# Patient Record
Sex: MALE | URBAN - METROPOLITAN AREA
[De-identification: names, ages, dates, MRNs, and addresses within clinical notes are randomized per-mention and may not be internally consistent; named-entity substitution may affect disease eponyms.]

---

## 2024-04-03 ENCOUNTER — LAB (OUTPATIENT)
Dept: URBAN - METROPOLITAN AREA CLINIC 113 | Facility: CLINIC | Age: 69
End: 2024-04-03

## 2024-04-03 ENCOUNTER — OV CON (OUTPATIENT)
Dept: URBAN - METROPOLITAN AREA CLINIC 113 | Facility: CLINIC | Age: 69
End: 2024-04-03
Payer: MEDICARE

## 2024-04-03 VITALS
RESPIRATION RATE: 16 BRPM | DIASTOLIC BLOOD PRESSURE: 105 MMHG | SYSTOLIC BLOOD PRESSURE: 135 MMHG | TEMPERATURE: 97.3 F | WEIGHT: 144 LBS | HEIGHT: 67 IN | HEART RATE: 86 BPM | BODY MASS INDEX: 22.6 KG/M2

## 2024-04-03 DIAGNOSIS — K43.2 INCISIONAL HERNIA, WITHOUT OBSTRUCTION OR GANGRENE: ICD-10-CM

## 2024-04-03 DIAGNOSIS — Z86.010 HISTORY OF ADENOMATOUS POLYP OF COLON: ICD-10-CM

## 2024-04-03 PROBLEM — 429047008: Status: ACTIVE | Noted: 2024-04-03

## 2024-04-03 PROCEDURE — 99203 OFFICE O/P NEW LOW 30 MIN: CPT | Performed by: NURSE PRACTITIONER

## 2024-04-03 RX ORDER — ALBUTEROL SULFATE 90 UG/1
1 PUFF AS NEEDED AEROSOL, METERED RESPIRATORY (INHALATION)
Status: ACTIVE | COMMUNITY

## 2024-04-03 RX ORDER — MONTELUKAST SODIUM 10 MG/1
TAKE 1 TABLET DAILY TABLET, FILM COATED ORAL
Refills: 0 | Status: ON HOLD | COMMUNITY
Start: 2008-03-06

## 2024-04-03 RX ORDER — FEXOFENADINE HCL 180 MG/1
TAKE 1 TABLET DAILY TABLET ORAL
Refills: 0 | Status: ACTIVE | COMMUNITY
Start: 2008-03-06

## 2024-04-03 NOTE — PHYSICAL EXAM GASTROINTESTINAL
Abdominal incision in the mid abdomen, just above the umbilicus with large incisional hernia noted to be protruding. Hernia is soft, nontender, but not easily reducible. No incarceration or strangulation of this hernia.

## 2024-06-03 ENCOUNTER — TELEPHONE ENCOUNTER (OUTPATIENT)
Dept: URBAN - METROPOLITAN AREA CLINIC 113 | Facility: CLINIC | Age: 69
End: 2024-06-03

## 2024-06-04 ENCOUNTER — DASHBOARD ENCOUNTERS (OUTPATIENT)
Age: 69
End: 2024-06-04

## 2024-06-04 ENCOUNTER — OFFICE VISIT (OUTPATIENT)
Dept: URBAN - METROPOLITAN AREA CLINIC 113 | Facility: CLINIC | Age: 69
End: 2024-06-04
Payer: MEDICARE

## 2024-06-04 ENCOUNTER — LAB OUTSIDE AN ENCOUNTER (OUTPATIENT)
Dept: URBAN - METROPOLITAN AREA CLINIC 113 | Facility: CLINIC | Age: 69
End: 2024-06-04

## 2024-06-04 VITALS
SYSTOLIC BLOOD PRESSURE: 134 MMHG | HEIGHT: 67 IN | RESPIRATION RATE: 18 BRPM | WEIGHT: 142 LBS | TEMPERATURE: 97.3 F | BODY MASS INDEX: 22.29 KG/M2 | DIASTOLIC BLOOD PRESSURE: 97 MMHG | HEART RATE: 93 BPM

## 2024-06-04 DIAGNOSIS — K92.1 MELENA: ICD-10-CM

## 2024-06-04 PROCEDURE — 99214 OFFICE O/P EST MOD 30 MIN: CPT | Performed by: NURSE PRACTITIONER

## 2024-06-04 RX ORDER — FEXOFENADINE HCL 180 MG/1
TAKE 1 TABLET DAILY TABLET ORAL
Refills: 0 | Status: ACTIVE | COMMUNITY
Start: 2008-03-06

## 2024-06-04 RX ORDER — MONTELUKAST SODIUM 10 MG/1
TAKE 1 TABLET DAILY TABLET, FILM COATED ORAL
Refills: 0 | Status: ON HOLD | COMMUNITY
Start: 2008-03-06

## 2024-06-04 RX ORDER — PANTOPRAZOLE SODIUM 40 MG/1
1 TABLET TABLET, DELAYED RELEASE ORAL ONCE A DAY
Status: ACTIVE | COMMUNITY

## 2024-06-04 RX ORDER — PANTOPRAZOLE SODIUM 40 MG/1
1 TABLET TABLET, DELAYED RELEASE ORAL ONCE A DAY
Qty: 90 TABLET | Refills: 2 | OUTPATIENT
Start: 2024-06-04

## 2024-06-04 RX ORDER — ALBUTEROL SULFATE 90 UG/1
1 PUFF AS NEEDED AEROSOL, METERED RESPIRATORY (INHALATION)
Status: ACTIVE | COMMUNITY

## 2024-06-04 NOTE — HPI-TODAY'S VISIT:
69 yo male with a history of adenomatous colon polyps, presenting for follow up.  He was seen in the office in April 2024 to discuss a colonoscopy in the setting COPD and an incisional hernia. A colonoscopy was deferred until hernia was addressed; a CTa/p with contrast was ordered to better define the hernia. It does not appear the CT was ever completed.  He was hospitalized briefly at [facility] 6/3/24 for subjective melena with hemoglobin noted to be 14.1, MCV 89.4, Plt 253. CTa/p with contrast revealed no acute abnormalities in the abdomen/pelvis. Fecal occult blood test was positive.   There is no trouble with swallowing. He denies any indigestion. There is some upper abdominal soreness. There is some more discomfort on an empty stomach. His appetite is decreased. He denies any nausea or vomiting. His weight is down one or two pounds per his report, attributed to decreased oral intake. He has bowel movements most every day. Stools are dark in color. There is sometime dark red blood per rectum.

## 2024-06-06 ENCOUNTER — CLAIMS CREATED FROM THE CLAIM WINDOW (OUTPATIENT)
Dept: URBAN - METROPOLITAN AREA SURGERY CENTER 25 | Facility: SURGERY CENTER | Age: 69
End: 2024-06-06
Payer: MEDICARE

## 2024-06-06 ENCOUNTER — CLAIMS CREATED FROM THE CLAIM WINDOW (OUTPATIENT)
Dept: URBAN - METROPOLITAN AREA CLINIC 4 | Facility: CLINIC | Age: 69
End: 2024-06-06
Payer: MEDICARE

## 2024-06-06 DIAGNOSIS — K31.7 POLYP OF STOMACH AND DUODENUM: ICD-10-CM

## 2024-06-06 DIAGNOSIS — K92.1 MELENA: ICD-10-CM

## 2024-06-06 DIAGNOSIS — K31.7 BENIGN GASTRIC POLYP: ICD-10-CM

## 2024-06-06 DIAGNOSIS — K29.70 GASTRITIS, UNSPECIFIED, WITHOUT BLEEDING: ICD-10-CM

## 2024-06-06 DIAGNOSIS — K29.70 CHRONIC ACITVE GASTRITIS (H.PYLORI NEGATIVE): ICD-10-CM

## 2024-06-06 DIAGNOSIS — K92.1 ACUTE MELENA: ICD-10-CM

## 2024-06-06 DIAGNOSIS — K31.89 OTHER DISEASES OF STOMACH AND DUODENUM: ICD-10-CM

## 2024-06-06 PROCEDURE — 43251 EGD REMOVE LESION SNARE: CPT | Performed by: CLINIC/CENTER

## 2024-06-06 PROCEDURE — 43239 EGD BIOPSY SINGLE/MULTIPLE: CPT | Performed by: INTERNAL MEDICINE

## 2024-06-06 PROCEDURE — 88342 IMHCHEM/IMCYTCHM 1ST ANTB: CPT | Performed by: PATHOLOGY

## 2024-06-06 PROCEDURE — G8907 PT DOC NO EVENTS ON DISCHARG: HCPCS | Performed by: CLINIC/CENTER

## 2024-06-06 PROCEDURE — 43251 EGD REMOVE LESION SNARE: CPT | Performed by: INTERNAL MEDICINE

## 2024-06-06 PROCEDURE — 43239 EGD BIOPSY SINGLE/MULTIPLE: CPT | Performed by: CLINIC/CENTER

## 2024-06-06 PROCEDURE — 00731 ANES UPR GI NDSC PX NOS: CPT | Performed by: ANESTHESIOLOGIST ASSISTANT

## 2024-06-06 PROCEDURE — 88305 TISSUE EXAM BY PATHOLOGIST: CPT | Performed by: PATHOLOGY

## 2024-06-06 PROCEDURE — 00731 ANES UPR GI NDSC PX NOS: CPT | Performed by: ANESTHESIOLOGY

## 2024-06-06 RX ORDER — FEXOFENADINE HCL 180 MG/1
TAKE 1 TABLET DAILY TABLET ORAL
Refills: 0 | Status: ACTIVE | COMMUNITY
Start: 2008-03-06

## 2024-06-06 RX ORDER — ALBUTEROL SULFATE 90 UG/1
1 PUFF AS NEEDED AEROSOL, METERED RESPIRATORY (INHALATION)
Status: ACTIVE | COMMUNITY

## 2024-06-06 RX ORDER — PANTOPRAZOLE SODIUM 40 MG/1
1 TABLET TABLET, DELAYED RELEASE ORAL ONCE A DAY
Qty: 90 TABLET | Refills: 2 | Status: ACTIVE | COMMUNITY
Start: 2024-06-04

## 2024-06-06 RX ORDER — MONTELUKAST SODIUM 10 MG/1
TAKE 1 TABLET DAILY TABLET, FILM COATED ORAL
Refills: 0 | Status: ON HOLD | COMMUNITY
Start: 2008-03-06

## 2024-06-06 RX ORDER — PANTOPRAZOLE SODIUM 40 MG/1
1 TABLET TABLET, DELAYED RELEASE ORAL ONCE A DAY
Status: ACTIVE | COMMUNITY

## 2024-06-13 ENCOUNTER — LAB OUTSIDE AN ENCOUNTER (OUTPATIENT)
Dept: URBAN - METROPOLITAN AREA CLINIC 113 | Facility: CLINIC | Age: 69
End: 2024-06-13

## 2024-06-13 ENCOUNTER — TELEPHONE ENCOUNTER (OUTPATIENT)
Dept: URBAN - METROPOLITAN AREA CLINIC 113 | Facility: CLINIC | Age: 69
End: 2024-06-13

## 2024-06-13 PROBLEM — 449341000124102: Status: ACTIVE | Noted: 2024-06-13

## 2024-06-13 PROBLEM — 59614000: Status: ACTIVE | Noted: 2024-06-13

## 2024-06-13 RX ORDER — POLYETHYLENE GLYCOL 3350, SODIUM CHLORIDE, SODIUM BICARBONATE, POTASSIUM CHLORIDE 420; 11.2; 5.72; 1.48 G/4L; G/4L; G/4L; G/4L
AS DIRECTED POWDER, FOR SOLUTION ORAL ONCE
Qty: 420 G | Refills: 0 | OUTPATIENT
Start: 2024-06-13 | End: 2024-06-14

## 2024-07-11 ENCOUNTER — OFFICE VISIT (OUTPATIENT)
Dept: URBAN - METROPOLITAN AREA CLINIC 113 | Facility: CLINIC | Age: 69
End: 2024-07-11
Payer: MEDICARE

## 2024-07-11 VITALS
TEMPERATURE: 97.3 F | BODY MASS INDEX: 22.26 KG/M2 | DIASTOLIC BLOOD PRESSURE: 95 MMHG | RESPIRATION RATE: 20 BRPM | WEIGHT: 141.8 LBS | HEIGHT: 67 IN | SYSTOLIC BLOOD PRESSURE: 133 MMHG | HEART RATE: 97 BPM

## 2024-07-11 DIAGNOSIS — R19.5 HEME POSITIVE STOOL: ICD-10-CM

## 2024-07-11 DIAGNOSIS — K92.1 MELENA: ICD-10-CM

## 2024-07-11 DIAGNOSIS — Z86.010 HISTORY OF ADENOMATOUS POLYP OF COLON: ICD-10-CM

## 2024-07-11 PROCEDURE — 99214 OFFICE O/P EST MOD 30 MIN: CPT | Performed by: NURSE PRACTITIONER

## 2024-07-11 RX ORDER — PANTOPRAZOLE SODIUM 40 MG/1
1 TABLET TABLET, DELAYED RELEASE ORAL ONCE A DAY
OUTPATIENT
Start: 2024-06-04

## 2024-07-11 RX ORDER — PANTOPRAZOLE SODIUM 40 MG/1
1 TABLET TABLET, DELAYED RELEASE ORAL ONCE A DAY
Qty: 90 TABLET | Refills: 2 | Status: ACTIVE | COMMUNITY
Start: 2024-06-04

## 2024-07-11 RX ORDER — FEXOFENADINE HCL 180 MG/1
TAKE 1 TABLET DAILY TABLET ORAL
Refills: 0 | Status: ACTIVE | COMMUNITY
Start: 2008-03-06

## 2024-07-11 RX ORDER — MONTELUKAST SODIUM 10 MG/1
TAKE 1 TABLET DAILY TABLET, FILM COATED ORAL
Refills: 0 | Status: ON HOLD | COMMUNITY
Start: 2008-03-06

## 2024-07-11 RX ORDER — ALBUTEROL SULFATE 90 UG/1
1 PUFF AS NEEDED AEROSOL, METERED RESPIRATORY (INHALATION)
Status: ACTIVE | COMMUNITY

## 2024-07-11 NOTE — HPI-TODAY'S VISIT:
70 yo male with a history of adenomatous colon polyps, presenting for follow up after an EGD performed for melena.  He was started on pantoprazole and planned for EGD.  EGD 6/6/24: Normal mucosa in the entire esophagus, regular Z-line 40 cmg from the incisors, erythematous mucosa in the gastric body and antrum s/p biopsies. Resection of two gastric  hyperplastic polyps. Nodular mucosa in the entire examined. Stomach biopsies revealed focal intestinal metaplasia; a repeat EGD is recommended in 6 to 12 months.  Following the EGD, he was recommended a colonoscopy. This has not been scheduled.  There is no trouble with swallowing or heartburn. He is taking pantoprazole 40 mg daily. No abdomina pain, nausea or vomiting. He does admit some increased gas. There is no melena or red blood per rectum.

## 2024-08-13 PROBLEM — 84568007: Status: ACTIVE | Noted: 2024-08-13

## 2024-08-13 PROBLEM — 78809005: Status: ACTIVE | Noted: 2024-08-13

## 2024-08-13 PROBLEM — 72519002: Status: ACTIVE | Noted: 2024-08-13

## 2024-08-14 ENCOUNTER — OFFICE VISIT (OUTPATIENT)
Dept: URBAN - METROPOLITAN AREA CLINIC 113 | Facility: CLINIC | Age: 69
End: 2024-08-14
Payer: MEDICARE

## 2024-08-14 VITALS
HEIGHT: 67 IN | RESPIRATION RATE: 18 BRPM | DIASTOLIC BLOOD PRESSURE: 74 MMHG | HEART RATE: 90 BPM | WEIGHT: 139.6 LBS | TEMPERATURE: 97.3 F | SYSTOLIC BLOOD PRESSURE: 113 MMHG | BODY MASS INDEX: 21.91 KG/M2

## 2024-08-14 DIAGNOSIS — K31.A0 GASTRIC INTESTINAL METAPLASIA: ICD-10-CM

## 2024-08-14 DIAGNOSIS — K29.40 ATROPHIC GASTRITIS WITHOUT HEMORRHAGE: ICD-10-CM

## 2024-08-14 DIAGNOSIS — K31.7 BENIGN GASTRIC POLYP: ICD-10-CM

## 2024-08-14 DIAGNOSIS — K92.1 MELENA: ICD-10-CM

## 2024-08-14 DIAGNOSIS — R19.5 HEME POSITIVE STOOL: ICD-10-CM

## 2024-08-14 DIAGNOSIS — Z86.010 HISTORY OF ADENOMATOUS POLYP OF COLON: ICD-10-CM

## 2024-08-14 PROCEDURE — 99213 OFFICE O/P EST LOW 20 MIN: CPT | Performed by: INTERNAL MEDICINE

## 2024-08-14 RX ORDER — PANTOPRAZOLE SODIUM 40 MG/1
1 TABLET TABLET, DELAYED RELEASE ORAL ONCE A DAY
Status: ACTIVE | COMMUNITY
Start: 2024-06-04

## 2024-08-14 RX ORDER — PANTOPRAZOLE SODIUM 40 MG/1
1 TABLET TABLET, DELAYED RELEASE ORAL ONCE A DAY
OUTPATIENT

## 2024-08-14 RX ORDER — FEXOFENADINE HCL 180 MG/1
TAKE 1 TABLET DAILY TABLET ORAL
Refills: 0 | Status: ACTIVE | COMMUNITY
Start: 2008-03-06

## 2024-08-14 RX ORDER — ALBUTEROL SULFATE 90 UG/1
1 PUFF AS NEEDED AEROSOL, METERED RESPIRATORY (INHALATION)
Status: ACTIVE | COMMUNITY

## 2024-08-14 RX ORDER — MONTELUKAST SODIUM 10 MG/1
TAKE 1 TABLET DAILY TABLET, FILM COATED ORAL
Refills: 0 | Status: ON HOLD | COMMUNITY
Start: 2008-03-06

## 2024-08-14 NOTE — HPI-OTHER HISTORIES
EGD on 6/6/24: Normal mucosa in the entire esophagus, regular Z-line 40 cm from the incisors, erythematous mucosa in the gastric body and antrum and biopsies were revealed chemical gastropathy with focal intestinal metaplasia negative for H. pylori.  May represent atrophic gastritis.  There was a 15 mm semipedunculated polyp in the antrum removed by hot snare and clips x 3 were placed.  This was a hyperplastic polyp with focal intestinal metaplasia.  There was a 12 mm polyp in the gastric antrum removed by hot snare.  This returned hyperplastic polyp with focal intestinal metaplasia.  Nodular mucosa scattered throughout the duodenum.  Repeat EGD is recommended in 6 to 12 months with APC available.  CT scan of abdomen pelvis with contrast on 6/3/2024 revealed a lesion in segment 7 of the liver possibly hemangioma, there is a probable cyst in the left liver.  The pancreas gallbladder and spleen were unremarkable.  There was extensive diverticulosis without inflammation.  There was no acute findings.  Colonoscopy on 4/25/2008 revealed multiple pancolonic diverticulosis with severe diverticulosis of the sigmoid and descending colon difficult to pass colonoscope.  There was a 10 mm polyp in the descending colon removed by hot snare, 25 mm polyp in the descending colon pedunculated at 48 cm.  Both polyps were tubulovillous adenoma.  There was difficulty visualizing the sigmoid/descending colon region due to severe spasm angulation and diverticulosis there was a erythematous superficially ulcerated area.  He was to follow-up in 1 years time but did not return.

## 2024-08-14 NOTE — PHYSICAL EXAM GASTROINTESTINAL
Abdomen , soft, nontender, nondistended , no guarding or rigidity , no masses palpable , normal bowel sounds , Liver and Spleen,  no hepatosplenomegaly , liver nontender.  He does have a large ventral wall hernia that is nontender and easily reducible.

## 2024-08-14 NOTE — HPI-TODAY'S VISIT:
70 yo male with a history of adenomatous colon polyps, presenting for follow up after an EGD performed for melena.  He was doing fairly well and then noticed last week he had some dark green stools.  There was episode of some small bright red blood as well.  He denies any chest pain or shortness of breath there is no abdominal pain, nausea or vomiting or heartburn.  He moves his bowels about every day to every other day he denies any NSAIDs.  There is no dysphagia.  He is scheduled for colonoscopy.  Blood work on 6/3/2024 revealed a hemoglobin of 14.1, WBC of 4.5 and platelet count 253,000.  Sodium 142 potassium 4 BUN 23 creatinine 1.35.  AST 24, ALT 18, alkaline phosphatase 58, total bili 0.7, PT/INR 0.98, lipase 101, HIV negative, hepatitis C antibody negative, Hemoccult positive stool.

## 2024-08-19 LAB
A/G RATIO: 1.8
ABSOLUTE BASOPHILS: 22
ABSOLUTE EOSINOPHILS: 99
ABSOLUTE LYMPHOCYTES: 1157
ABSOLUTE MONOCYTES: 542
ABSOLUTE NEUTROPHILS: 2481
ALBUMIN: 4.4
ALKALINE PHOSPHATASE: 52
ALT (SGPT): 12
ANTIPARIETAL CELL ANTIBODY: <=20
AST (SGOT): 12
BASOPHILS: 0.5
BILIRUBIN, TOTAL: 0.5
BUN/CREATININE RATIO: 18
BUN: 26
CALCIUM: 9.5
CARBON DIOXIDE, TOTAL: 26
CHLORIDE: 107
CREATININE: 1.43
EGFR: 53
EOSINOPHILS: 2.3
FOLATE, SERUM: 10.6
GLOBULIN, TOTAL: 2.4
GLUCOSE: 86
HEMATOCRIT: 40
HEMOGLOBIN: 12.4
INR: 1
INTRINSIC FACTOR BLOCKING: NEGATIVE
LYMPHOCYTES: 26.9
MCH: 28.2
MCHC: 31
MCV: 91.1
MONOCYTES: 12.6
MPV: 10.8
NEUTROPHILS: 57.7
PLATELET COUNT: 294
POTASSIUM: 4.2
PROTEIN, TOTAL: 6.8
PT: 10.6
RDW: 12.5
RED BLOOD CELL COUNT: 4.39
SODIUM: 143
VITAMIN B12: 461
WHITE BLOOD CELL COUNT: 4.3

## 2024-09-12 ENCOUNTER — OFFICE VISIT (OUTPATIENT)
Dept: URBAN - METROPOLITAN AREA SURGERY CENTER 25 | Facility: SURGERY CENTER | Age: 69
End: 2024-09-12

## 2024-09-26 ENCOUNTER — OFFICE VISIT (OUTPATIENT)
Dept: URBAN - METROPOLITAN AREA SURGERY CENTER 25 | Facility: SURGERY CENTER | Age: 69
End: 2024-09-26

## 2024-09-26 RX ORDER — ALBUTEROL SULFATE 90 UG/1
1 PUFF AS NEEDED AEROSOL, METERED RESPIRATORY (INHALATION)
Status: ACTIVE | COMMUNITY

## 2024-09-26 RX ORDER — FEXOFENADINE HCL 180 MG/1
TAKE 1 TABLET DAILY TABLET ORAL
Refills: 0 | Status: ACTIVE | COMMUNITY
Start: 2008-03-06

## 2024-09-26 RX ORDER — PANTOPRAZOLE SODIUM 40 MG/1
1 TABLET TABLET, DELAYED RELEASE ORAL ONCE A DAY
Status: ACTIVE | COMMUNITY

## 2024-09-26 RX ORDER — MONTELUKAST SODIUM 10 MG/1
TAKE 1 TABLET DAILY TABLET, FILM COATED ORAL
Refills: 0 | Status: ON HOLD | COMMUNITY
Start: 2008-03-06

## 2024-10-03 ENCOUNTER — OFFICE VISIT (OUTPATIENT)
Dept: URBAN - METROPOLITAN AREA CLINIC 113 | Facility: CLINIC | Age: 69
End: 2024-10-03

## 2024-10-14 PROBLEM — 429047008: Status: ACTIVE | Noted: 2024-10-14

## 2024-10-15 ENCOUNTER — OFFICE VISIT (OUTPATIENT)
Dept: URBAN - METROPOLITAN AREA CLINIC 113 | Facility: CLINIC | Age: 69
End: 2024-10-15

## 2024-10-15 RX ORDER — MONTELUKAST SODIUM 10 MG/1
TAKE 1 TABLET DAILY TABLET, FILM COATED ORAL
Refills: 0 | Status: ON HOLD | COMMUNITY
Start: 2008-03-06

## 2024-10-15 RX ORDER — PANTOPRAZOLE SODIUM 40 MG/1
1 TABLET TABLET, DELAYED RELEASE ORAL ONCE A DAY
Status: ACTIVE | COMMUNITY

## 2024-10-15 RX ORDER — FEXOFENADINE HCL 180 MG/1
TAKE 1 TABLET DAILY TABLET ORAL
Refills: 0 | Status: ACTIVE | COMMUNITY
Start: 2008-03-06

## 2024-10-15 RX ORDER — PANTOPRAZOLE SODIUM 40 MG/1
1 TABLET TABLET, DELAYED RELEASE ORAL ONCE A DAY
OUTPATIENT

## 2024-10-15 RX ORDER — ALBUTEROL SULFATE 90 UG/1
1 PUFF AS NEEDED AEROSOL, METERED RESPIRATORY (INHALATION)
Status: ACTIVE | COMMUNITY

## 2024-10-15 NOTE — HPI-OTHER HISTORIES
Attempted colonoscopy on 9/26/2024 revealed multiple sigmoid diverticuli with significantly tortuous sigmoid with multiply angulated fixed sigmoid colon.  Pediatric colonoscope could not pass and subsequent EGD scope could not pass beyond this region the exam was aborted and incomplete.  EGD on 6/6/24: Normal mucosa in the entire esophagus, regular Z-line 40 cm from the incisors, erythematous mucosa in the gastric body and antrum and biopsies were revealed chemical gastropathy with focal intestinal metaplasia negative for H. pylori.  May represent atrophic gastritis.  There was a 15 mm semipedunculated polyp in the antrum removed by hot snare and clips x 3 were placed.  This was a hyperplastic polyp with focal intestinal metaplasia.  There was a 12 mm polyp in the gastric antrum removed by hot snare.  This returned hyperplastic polyp with focal intestinal metaplasia.  Nodular mucosa scattered throughout the duodenum.  Repeat EGD is recommended in 6 to 12 months with APC available.  CT scan of abdomen pelvis with contrast on 6/3/2024 revealed a lesion in segment 7 of the liver possibly hemangioma, there is a probable cyst in the left liver.  The pancreas gallbladder and spleen were unremarkable.  There was extensive diverticulosis without inflammation.  There was no acute findings.  Colonoscopy on 4/25/2008 revealed multiple pancolonic diverticulosis with severe diverticulosis of the sigmoid and descending colon difficult to pass colonoscope.  There was a 10 mm polyp in the descending colon removed by hot snare, 25 mm polyp in the descending colon pedunculated at 48 cm.  Both polyps were tubulovillous adenoma.  There was difficulty visualizing the sigmoid/descending colon region due to severe spasm angulation and diverticulosis there was a erythematous superficially ulcerated area.  He was to follow-up in 1 years time but did not return.

## 2024-10-15 NOTE — HPI-TODAY'S VISIT:
68 yo male with a history of adenomatous colon polyps, presenting for follow up after an EGD performed for melena and aborted colonoscopy due to anatomy with fixed, multiply angulated tortuous colon.  He was doing fairly well and then noticed last week he had some dark green stools.  There was episode of some small bright red blood as well.  He denies any chest pain or shortness of breath there is no abdominal pain, nausea or vomiting or heartburn.  He moves his bowels about every day to every other day he denies any NSAIDs.  There is no dysphagia.   Blood work on 8/14/2024 revealed a hemoglobin 12.4, WBC of 4.3 and platelet count of 294,000. MCV is 91.  B sodium 143 potassium 4.2 BUN 26 creatinine 1.43. AST 12, ALT 12, alkaline phosphatase 52, total bili 0.5, albumin 4.4, PT/INR is 1.0.  L Blood work on 6/3/2024 revealed a hemoglobin of 14.1, WBC of 4.5 and platelet count 253,000.  Sodium 142 potassium 4 BUN 23 creatinine 1.35.  AST 24, ALT 18, alkaline phosphatase 58, total bili 0.7, PT/INR 0.98, lipase 101, HIV negative, hepatitis C antibody negative, Hemoccult positive stool.

## 2024-11-12 ENCOUNTER — OFFICE VISIT (OUTPATIENT)
Dept: URBAN - METROPOLITAN AREA CLINIC 113 | Facility: CLINIC | Age: 69
End: 2024-11-12

## 2024-11-12 RX ORDER — MONTELUKAST SODIUM 10 MG/1
TAKE 1 TABLET DAILY TABLET, FILM COATED ORAL
Refills: 0 | Status: ON HOLD | COMMUNITY
Start: 2008-03-06

## 2024-11-12 RX ORDER — PANTOPRAZOLE SODIUM 40 MG/1
1 TABLET TABLET, DELAYED RELEASE ORAL ONCE A DAY
OUTPATIENT

## 2024-11-12 RX ORDER — ALBUTEROL SULFATE 90 UG/1
1 PUFF AS NEEDED AEROSOL, METERED RESPIRATORY (INHALATION)
Status: ACTIVE | COMMUNITY

## 2024-11-12 RX ORDER — PANTOPRAZOLE SODIUM 40 MG/1
1 TABLET TABLET, DELAYED RELEASE ORAL ONCE A DAY
Status: ACTIVE | COMMUNITY

## 2024-11-12 RX ORDER — FEXOFENADINE HCL 180 MG/1
TAKE 1 TABLET DAILY TABLET ORAL
Refills: 0 | Status: ACTIVE | COMMUNITY
Start: 2008-03-06